# Patient Record
Sex: OTHER/UNKNOWN | Race: WHITE | NOT HISPANIC OR LATINO | URBAN - METROPOLITAN AREA
[De-identification: names, ages, dates, MRNs, and addresses within clinical notes are randomized per-mention and may not be internally consistent; named-entity substitution may affect disease eponyms.]

---

## 2018-09-12 ENCOUNTER — APPOINTMENT (OUTPATIENT)
Age: 43
Setting detail: DERMATOLOGY
End: 2018-09-13

## 2018-09-12 VITALS
WEIGHT: 240 LBS | HEIGHT: 65 IN | HEART RATE: 85 BPM | SYSTOLIC BLOOD PRESSURE: 140 MMHG | DIASTOLIC BLOOD PRESSURE: 100 MMHG

## 2018-09-12 DIAGNOSIS — D18.0 HEMANGIOMA: ICD-10-CM

## 2018-09-12 DIAGNOSIS — D17 BENIGN LIPOMATOUS NEOPLASM: ICD-10-CM

## 2018-09-12 DIAGNOSIS — L82.1 OTHER SEBORRHEIC KERATOSIS: ICD-10-CM

## 2018-09-12 DIAGNOSIS — L82.0 INFLAMED SEBORRHEIC KERATOSIS: ICD-10-CM

## 2018-09-12 DIAGNOSIS — L72.0 EPIDERMAL CYST: ICD-10-CM

## 2018-09-12 DIAGNOSIS — Z71.89 OTHER SPECIFIED COUNSELING: ICD-10-CM

## 2018-09-12 PROBLEM — D18.01 HEMANGIOMA OF SKIN AND SUBCUTANEOUS TISSUE: Status: ACTIVE | Noted: 2018-09-12

## 2018-09-12 PROBLEM — D17.24 BENIGN LIPOMATOUS NEOPLASM OF SKIN AND SUBCUTANEOUS TISSUE OF LEFT LEG: Status: ACTIVE | Noted: 2018-09-12

## 2018-09-12 PROBLEM — F32.9 MAJOR DEPRESSIVE DISORDER, SINGLE EPISODE, UNSPECIFIED: Status: ACTIVE | Noted: 2018-09-12

## 2018-09-12 PROBLEM — F41.9 ANXIETY DISORDER, UNSPECIFIED: Status: ACTIVE | Noted: 2018-09-12

## 2018-09-12 PROCEDURE — OTHER REASSURANCE: OTHER

## 2018-09-12 PROCEDURE — 99203 OFFICE O/P NEW LOW 30 MIN: CPT | Mod: 25

## 2018-09-12 PROCEDURE — OTHER COUNSELING: OTHER

## 2018-09-12 PROCEDURE — OTHER SUNSCREEN RECOMMENDATIONS: OTHER

## 2018-09-12 PROCEDURE — 17110 DESTRUCT B9 LESION 1-14: CPT

## 2018-09-12 PROCEDURE — OTHER LIQUID NITROGEN: OTHER

## 2018-09-12 PROCEDURE — OTHER MIPS QUALITY: OTHER

## 2018-09-12 ASSESSMENT — LOCATION ZONE DERM
LOCATION ZONE: LEG
LOCATION ZONE: NECK
LOCATION ZONE: FACE
LOCATION ZONE: ARM
LOCATION ZONE: TRUNK
LOCATION ZONE: SCALP

## 2018-09-12 ASSESSMENT — LOCATION DETAILED DESCRIPTION DERM
LOCATION DETAILED: LEFT BUTTOCK
LOCATION DETAILED: RIGHT CENTRAL FRONTAL SCALP
LOCATION DETAILED: RIGHT DISTAL DORSAL FOREARM
LOCATION DETAILED: LEFT PROXIMAL PRETIBIAL REGION
LOCATION DETAILED: LEFT PROXIMAL DORSAL FOREARM
LOCATION DETAILED: LEFT SUPERIOR ANTERIOR NECK
LOCATION DETAILED: RIGHT FOREHEAD
LOCATION DETAILED: RIGHT CENTRAL MALAR CHEEK
LOCATION DETAILED: RIGHT PROXIMAL DORSAL FOREARM
LOCATION DETAILED: LEFT SUPERIOR MEDIAL FOREHEAD

## 2018-09-12 ASSESSMENT — LOCATION SIMPLE DESCRIPTION DERM
LOCATION SIMPLE: LEFT ANTERIOR NECK
LOCATION SIMPLE: LEFT PRETIBIAL REGION
LOCATION SIMPLE: RIGHT FOREHEAD
LOCATION SIMPLE: LEFT FOREHEAD
LOCATION SIMPLE: RIGHT CHEEK
LOCATION SIMPLE: LEFT BUTTOCK
LOCATION SIMPLE: SCALP
LOCATION SIMPLE: LEFT FOREARM
LOCATION SIMPLE: RIGHT FOREARM

## 2018-09-12 ASSESSMENT — PAIN INTENSITY VAS: HOW INTENSE IS YOUR PAIN 0 BEING NO PAIN, 10 BEING THE MOST SEVERE PAIN POSSIBLE?: NO PAIN

## 2018-09-12 NOTE — PROCEDURE: LIQUID NITROGEN
Number Of Freeze-Thaw Cycles: 3 freeze-thaw cycles
Medical Necessity Clause: This procedure was medically necessary because the lesions that were treated were:
Include Z78.9 (Other Specified Conditions Influencing Health Status) As An Associated Diagnosis?: No
Duration Of Freeze Thaw-Cycle (Seconds): 5-10
Render Post-Care Instructions In Note?: yes
Post-Care Instructions: I reviewed with the patient in detail post-care instructions. Patient is to wear sunprotection, and avoid picking at any of the treated lesions. Pt may apply Vaseline to crusted or scabbing areas.
Consent: The patient's consent was obtained including but not limited to risks of crusting, scabbing, blistering, scarring, darker or lighter pigmentary change, recurrence, incomplete removal and infection.
Detail Level: Detailed
Medical Necessity Information: It is in your best interest to select a reason for this procedure from the list below. All of these items fulfill various CMS LCD requirements except the new and changing color options.

## 2018-09-12 NOTE — PROCEDURE: REASSURANCE
Detail Level: Zone
Detail Level: Generalized
Hide Additional Notes?: No
Include Location In Plan?: Yes